# Patient Record
Sex: MALE | Race: WHITE | NOT HISPANIC OR LATINO | URBAN - METROPOLITAN AREA
[De-identification: names, ages, dates, MRNs, and addresses within clinical notes are randomized per-mention and may not be internally consistent; named-entity substitution may affect disease eponyms.]

---
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
## 2025-01-07 ENCOUNTER — OFFICE VISIT (OUTPATIENT)
Dept: URGENT CARE | Facility: CLINIC | Age: 20
End: 2025-01-07
Payer: COMMERCIAL

## 2025-01-07 VITALS
HEART RATE: 88 BPM | HEIGHT: 74 IN | SYSTOLIC BLOOD PRESSURE: 133 MMHG | BODY MASS INDEX: 25.03 KG/M2 | RESPIRATION RATE: 18 BRPM | DIASTOLIC BLOOD PRESSURE: 86 MMHG | WEIGHT: 195 LBS | OXYGEN SATURATION: 99 % | TEMPERATURE: 98 F

## 2025-01-07 DIAGNOSIS — J02.9 VIRAL PHARYNGITIS: Primary | ICD-10-CM

## 2025-01-07 LAB
CTP QC/QA: YES
MOLECULAR STREP A: NEGATIVE

## 2025-01-07 PROCEDURE — 87651 STREP A DNA AMP PROBE: CPT | Mod: QW,S$GLB,, | Performed by: NURSE PRACTITIONER

## 2025-01-07 PROCEDURE — 99203 OFFICE O/P NEW LOW 30 MIN: CPT | Mod: S$GLB,,, | Performed by: NURSE PRACTITIONER

## 2025-01-07 NOTE — PROGRESS NOTES
"Subjective:      Patient ID: Ruel Ching is a 19 y.o. male.    Vitals:  height is 6' 2" (1.88 m) and weight is 88.5 kg (195 lb). His oral temperature is 98.2 °F (36.8 °C). His blood pressure is 133/86 and his pulse is 88. His respiration is 18 and oxygen saturation is 99%.     Chief Complaint: Fever    This is a 19 y.o. male who presents today with a chief complaint of sore throat. Pt states feels like something (like toothpick sensation) is stuck in throat. Can not think of any food getting caught in throat or scratching his throat.  Pt states he had fever-tmax 102, but no fever in last 24 hours.  He initially felt symptom on 1/1 while he was resting. Ibuprofen helped a little bit.     Fever   This is a new problem. The current episode started 4-13 days. The problem occurs constantly. The problem has been unchanged. He has not experienced a heat injury.His temperature was unmeasured prior to arrival. The temperature was taken using an oral thermometer. Associated symptoms include headaches and a sore throat. Pertinent negatives include no congestion, coughing, diarrhea, vomiting or wheezing. He has tried nothing for the symptoms. The treatment provided no relief.       Constitution: Positive for fever.   HENT:  Positive for sore throat. Negative for congestion.    Neck: Negative for neck swelling.   Respiratory:  Negative for cough, shortness of breath and wheezing.    Gastrointestinal:  Negative for vomiting and diarrhea.   Neurological:  Positive for headaches.      Objective:     Physical Exam   Constitutional: He is oriented to person, place, and time.  Non-toxic appearance. He does not appear ill. No distress.   HENT:   Head: Normocephalic.   Ears:   Right Ear: Tympanic membrane, external ear and ear canal normal.   Left Ear: Tympanic membrane, external ear and ear canal normal.   Nose: Nose normal.   Mouth/Throat: Mucous membranes are moist. Posterior oropharyngeal erythema present.      Comments: Pt has " what looks like healing vesicles vs enlarged pores to bilateral tonsils, with mild erythema. No exudates noted. Airway patent.   Eyes: Right eye exhibits no discharge. Left eye exhibits no discharge.   Neck: Neck supple. No neck rigidity present.   Cardiovascular: Normal rate and regular rhythm.   Pulmonary/Chest: Effort normal and breath sounds normal.   Abdominal: Normal appearance.   Musculoskeletal: Normal range of motion.         General: Normal range of motion.   Lymphadenopathy:     He has no cervical adenopathy.   Neurological: He is alert and oriented to person, place, and time.   Skin: Skin is warm, dry and not diaphoretic.   Psychiatric: His behavior is normal. Mood normal.   Nursing note and vitals reviewed.    Results for orders placed or performed in visit on 01/07/25   POCT Strep A, Molecular    Collection Time: 01/07/25 11:38 AM   Result Value Ref Range    Molecular Strep A, POC Negative Negative     Acceptable Yes       Assessment:     1. Viral pharyngitis        Plan:       Viral pharyngitis  -     POCT Strep A, Molecular      Patient Instructions   Oral fluids-keep throat moist at all times   Rest  Soft foods for throat pain   Droplet and contact precautions (good handwashing, cover coughs and sneezes, no food/drink sharing, wipe down surfaces after use)   Warm salt water gargles   Ibuprofen and/or tylenol for pain relief

## 2025-07-29 ENCOUNTER — TELEPHONE (OUTPATIENT)
Dept: INTERNAL MEDICINE | Facility: CLINIC | Age: 20
End: 2025-07-29
Payer: COMMERCIAL

## 2025-07-30 ENCOUNTER — OFFICE VISIT (OUTPATIENT)
Dept: INTERNAL MEDICINE | Facility: CLINIC | Age: 20
End: 2025-07-30
Payer: COMMERCIAL

## 2025-07-30 VITALS
SYSTOLIC BLOOD PRESSURE: 134 MMHG | DIASTOLIC BLOOD PRESSURE: 82 MMHG | WEIGHT: 246.69 LBS | BODY MASS INDEX: 31.66 KG/M2 | OXYGEN SATURATION: 98 % | HEIGHT: 74 IN | HEART RATE: 66 BPM

## 2025-07-30 DIAGNOSIS — R29.898 OTHER SYMPTOMS AND SIGNS INVOLVING THE MUSCULOSKELETAL SYSTEM: ICD-10-CM

## 2025-07-30 DIAGNOSIS — Z00.00 ANNUAL PHYSICAL EXAM: Primary | ICD-10-CM

## 2025-07-30 DIAGNOSIS — E66.09 CLASS 1 OBESITY DUE TO EXCESS CALORIES WITHOUT SERIOUS COMORBIDITY WITH BODY MASS INDEX (BMI) OF 31.0 TO 31.9 IN ADULT: ICD-10-CM

## 2025-07-30 DIAGNOSIS — Z13.6 ENCOUNTER FOR SCREENING FOR CARDIOVASCULAR DISORDERS: ICD-10-CM

## 2025-07-30 DIAGNOSIS — E66.811 CLASS 1 OBESITY DUE TO EXCESS CALORIES WITHOUT SERIOUS COMORBIDITY WITH BODY MASS INDEX (BMI) OF 31.0 TO 31.9 IN ADULT: ICD-10-CM

## 2025-07-30 DIAGNOSIS — Z13.1 SCREENING FOR DIABETES MELLITUS: ICD-10-CM

## 2025-07-30 DIAGNOSIS — Z13.29 SCREENING FOR THYROID DISORDER: ICD-10-CM

## 2025-07-30 PROCEDURE — 3075F SYST BP GE 130 - 139MM HG: CPT | Mod: CPTII,S$GLB,,

## 2025-07-30 PROCEDURE — 99385 PREV VISIT NEW AGE 18-39: CPT | Mod: S$GLB,,,

## 2025-07-30 PROCEDURE — 99999 PR PBB SHADOW E&M-EST. PATIENT-LVL III: CPT | Mod: PBBFAC,,,

## 2025-07-30 PROCEDURE — 3008F BODY MASS INDEX DOCD: CPT | Mod: CPTII,S$GLB,,

## 2025-07-30 PROCEDURE — 3079F DIAST BP 80-89 MM HG: CPT | Mod: CPTII,S$GLB,,

## 2025-07-30 NOTE — PROGRESS NOTES
Internal Medicine Annual Exam       CHIEF COMPLAINT     The patient, Ruel Ching, who is a 20 y.o. male presents for an annual exam.    DAXA Lipscomb reports feeling well with no present issues.    RIGHT SHOULDER  He notes a long standing ability to rotate his right shoulder blade independently compared to his left side. He denies pain. He confirms no decreased range of motion.    SLEEP:  He obtains approximately 8 hours of sleep per night. He experiences difficulty falling asleep, which he attributes to prolonged phone use prior to bedtime. He tends to sleep longer when his alarm does not wake him up.    DIET AND EXERCISE:  He reports a consistent exercise routine of daily workouts for approximately 20 consecutive days without rest. He recently took his first rest day 3-4 days ago and has not yet exercised today. He prepares meals including salads with chicken and burgers using a small grill in his apartment. He admits to consuming junk food frequently. He receives additional meals from his mother when visiting her.    GASTROINTESTINAL:  He reports bowel movements occurring every other day. He describes bowel movements as appearing normal in consistency, without blood or mucus. He denies experiencing diarrhea.    SUPPLEMENTS:  He takes creatine and protein supplements.     SOCIAL HISTORY:  He denies current sexual activity. He reports drinking only on special occasions, typically when spending time with his father. He denies drinking weekly. When consuming alcohol, he states he drinks to achieve a slight buzz, which he describes as either 10 beers or 2 old-fashioneds in a single occasion. He acknowledges the potential health risks of consuming large quantities of alcohol.    ROS:  General: -fever, -chills, -fatigue, -weight gain, -weight loss  Eyes: -vision changes, -redness, -discharge  ENT: -ear pain, -nasal congestion, -sore throat  Cardiovascular: -chest pain, -palpitations, -lower extremity  edema  Respiratory: -cough, -shortness of breath  Gastrointestinal: -abdominal pain, -nausea, -vomiting, -diarrhea, -constipation, -blood in stool  Genitourinary: -dysuria, -hematuria, -frequency  Musculoskeletal: -joint pain, -muscle pain, +pain with movement  Skin: -rash, -lesion  Neurological: -headache, -dizziness, -numbness, -tingling, +difficulty falling asleep  Psychiatric: -anxiety, -depression, -sleep difficulty        Past Medical History:  No past medical history on file.    No past surgical history on file.     Family History   Problem Relation Name Age of Onset    Migraines Mother      Hypertension Father      Brain cancer Maternal Grandmother      Lung cancer Maternal Grandfather      Hypertension Paternal Grandmother      Prostate cancer Paternal Grandfather        Social History[1]     Tobacco Use History[2]     Allergies as of 07/30/2025 - Reviewed 07/30/2025   Allergen Reaction Noted    Insect venom Other (See Comments) 05/25/2022    Grape Rash 05/25/2022    Donalds Rash 05/25/2022      Home Medications:  Prior to Admission medications    Not on File     Health Maintainence:   Immunizations:  Health Maintenance         Date Due Completion Date    Hepatitis C Screening Never done ---    Lipid Panel Never done ---    HIV Screening Never done ---    HPV Vaccines (2 - Male 3-dose series) 06/22/2022 5/25/2022    COVID-19 Vaccine (3 - 2024-25 season) 07/30/2026 (Originally 9/1/2024) 8/20/2021    Influenza Vaccine (1) 09/01/2025 11/29/2016    TETANUS VACCINE 11/29/2026 11/29/2016    RSV Vaccine (Age 60+ and Pregnant patients) (1 - 1-dose 75+ series) 03/04/2080 ---           PHYSICAL EXAM     Vitals: BMI: 31.67.  General: No acute distress. Well-developed. Obese  Eyes: EOMI. Sclerae anicteric.  HENT: Normocephalic. Atraumatic. Nares patent. Moist oral mucosa.  Ears: Bilateral TMs clear. Bilateral EACs clear.  Cardiovascular: Regular rate. Regular rhythm. No murmurs. No rubs. No gallops. Normal S1,  "S2.  Respiratory: Normal respiratory effort. Clear to auscultation bilaterally. No rales. No rhonchi. No wheezing.  Abdomen: Soft. Non-tender. Non-distended. Normoactive bowel sounds.  Musculoskeletal: No  obvious deformity.  Extremities: No lower extremity edema.  Neurological: Alert & oriented x3. No slurred speech. Normal gait.  Psychiatric: Normal mood. Normal affect. Good insight. Good judgment.  Skin: Warm. Dry. No rash.        /82 (BP Location: Left arm, Patient Position: Sitting)   Pulse 66   Ht 6' 2" (1.88 m)   Wt 111.9 kg (246 lb 11.1 oz)   SpO2 98%   BMI 31.67 kg/m²  Body mass index is 31.67 kg/m².    LABS     No results found for: "LABA1C", "HGBA1C"  CMP  No results found for: "NA", "K", "CL", "CO2", "GLU", "BUN", "CREATININE", "CALCIUM", "PROT", "ALBUMIN", "BILITOT", "ALKPHOS", "AST", "ALT", "ANIONGAP", "ESTGFRAFRICA", "EGFRNONAA"  No results found for: "WBC", "HGB", "HCT", "MCV", "PLT"  No results found for: "CHOL"  No results found for: "HDL"  No results found for: "LDLCALC"  No results found for: "TRIG"  No results found for: "CHOLHDL"  No results found for: "TSH", "E2GDBXQ", "[id number]", "THYROIDAB"    ASSESSMENT/PLAN     Ruel Ching is a very pleasant 20 y.o. male    ANNUAL PHYSICAL EXAM  - Assessed overall health status as very healthy based on physical exam and reported symptoms.  - Reviewed use of creatine and protein supplements; will assess kidney function through labs.   - Advised maintaining adequate hydration, especially when using creatine.  - Discussed importance of safe sex practices, emphasizing condom use. Pt reports he has not been sexual active and declines STI testing.  - Advised on the importance of moderating alcohol consumption and its potential long-term health impacts.  - Discussed importance of sleep hygiene and recommended: avoiding phone use and reducing TV time before bed, taking a shower or bath, reading a book, avoiding caffeine consumption and eating " immediately before bedtime, and implementing relaxation techniques to improve sleep onset.  -     LIPID PANEL; Future; Expected date: 07/30/2025  -     CBC W/ AUTO DIFFERENTIAL; Future; Expected date: 07/30/2025  -     COMPREHENSIVE METABOLIC PANEL; Future; Expected date: 07/30/2025  -     TSH; Future; Expected date: 07/30/2025  -     Urinalysis Microscopic  -     HEMOGLOBIN A1C; Future; Expected date: 07/30/2025    OBESITY:  - Calculated BMI at 31.67, classified as class 1 obesity without comorbidities.  - Educated patient on potential health risks including insulin resistance, diabetes, hypertension, and their effects on kidneys, eyes, and heart.  - Discussed how excess caloric intake contributes to current BMI classification.  - Recommend incorporating more vegetables into diet and reducing intake of high-sodium and high-fat foods  - Ordered lipid panel and hemoglobin A1C to assess metabolic health.    SYMPTOMS AND SIGNS INVOLVING THE MUSCULOSKELETAL SYSTEM:  - Hypermobility of right scapula is likely Scapular Winging.  - Confirmed no pain or decreased range of motion, and completely voluntary.  - No immediate intervention necessary as long as discomfort and range of motion remain normal.  - Advised to continue current exercise routine with focus on proper form during upper body workouts.    Screening for diabetes mellitus  -     HEMOGLOBIN A1C; Future; Expected date: 07/30/2025    Screening for thyroid disorder  -     TSH; Future; Expected date: 07/30/2025    Encounter for screening for cardiovascular disorders  -     LIPID PANEL; Future; Expected date: 07/30/2025  -     CBC W/ AUTO DIFFERENTIAL; Future; Expected date: 07/30/2025  -     COMPREHENSIVE METABOLIC PANEL; Future; Expected date: 07/30/2025    To establish care with PCP    This note was generated with the assistance of ambient listening technology. Verbal consent was obtained by the patient and accompanying visitor(s) for the recording of patient  appointment to facilitate this note. I attest to having reviewed and edited the generated note for accuracy, though some syntax or spelling errors may persist. Please contact the author of this note for any clarification.       Annita ACOSTA, LEO, FNP-c   Department of Internal Medicine - [facility]  1:49 PM         [1]   Social History  Socioeconomic History    Marital status: Single   Tobacco Use    Smoking status: Never     Passive exposure: Never    Smokeless tobacco: Never   Substance and Sexual Activity    Alcohol use: Never   [2]   Social History  Tobacco Use   Smoking Status Never    Passive exposure: Never   Smokeless Tobacco Never

## 2025-07-31 ENCOUNTER — LAB VISIT (OUTPATIENT)
Dept: LAB | Facility: HOSPITAL | Age: 20
End: 2025-07-31
Payer: COMMERCIAL

## 2025-07-31 DIAGNOSIS — Z13.1 SCREENING FOR DIABETES MELLITUS: ICD-10-CM

## 2025-07-31 DIAGNOSIS — Z00.00 ANNUAL PHYSICAL EXAM: ICD-10-CM

## 2025-07-31 DIAGNOSIS — Z13.29 SCREENING FOR THYROID DISORDER: ICD-10-CM

## 2025-07-31 DIAGNOSIS — Z13.6 ENCOUNTER FOR SCREENING FOR CARDIOVASCULAR DISORDERS: ICD-10-CM

## 2025-07-31 LAB
ABSOLUTE EOSINOPHIL (OHS): 0.27 K/UL
ABSOLUTE MONOCYTE (OHS): 0.86 K/UL (ref 0.3–1)
ABSOLUTE NEUTROPHIL COUNT (OHS): 3.21 K/UL (ref 1.8–7.7)
ALBUMIN SERPL BCP-MCNC: 4.4 G/DL (ref 3.5–5.2)
ALP SERPL-CCNC: 60 UNIT/L (ref 40–150)
ALT SERPL W/O P-5'-P-CCNC: 68 UNIT/L (ref 0–55)
ANION GAP (OHS): 8 MMOL/L (ref 8–16)
AST SERPL-CCNC: 37 UNIT/L (ref 0–50)
BASOPHILS # BLD AUTO: 0.06 K/UL
BASOPHILS NFR BLD AUTO: 0.8 %
BILIRUB SERPL-MCNC: 0.7 MG/DL (ref 0.1–1)
BUN SERPL-MCNC: 17 MG/DL (ref 6–20)
CALCIUM SERPL-MCNC: 9.4 MG/DL (ref 8.7–10.5)
CHLORIDE SERPL-SCNC: 105 MMOL/L (ref 95–110)
CHOLEST SERPL-MCNC: 143 MG/DL (ref 120–199)
CHOLEST/HDLC SERPL: 3.3 {RATIO} (ref 2–5)
CO2 SERPL-SCNC: 25 MMOL/L (ref 23–29)
CREAT SERPL-MCNC: 1.1 MG/DL (ref 0.5–1.4)
EAG (OHS): 91 MG/DL (ref 68–131)
ERYTHROCYTE [DISTWIDTH] IN BLOOD BY AUTOMATED COUNT: 12.6 % (ref 11.5–14.5)
GFR SERPLBLD CREATININE-BSD FMLA CKD-EPI: >60 ML/MIN/1.73/M2
GLUCOSE SERPL-MCNC: 89 MG/DL (ref 70–110)
HBA1C MFR BLD: 4.8 % (ref 4–5.6)
HCT VFR BLD AUTO: 50 % (ref 40–54)
HDLC SERPL-MCNC: 43 MG/DL (ref 40–75)
HDLC SERPL: 30.1 % (ref 20–50)
HGB BLD-MCNC: 16.1 GM/DL (ref 14–18)
IMM GRANULOCYTES # BLD AUTO: 0.02 K/UL (ref 0–0.04)
IMM GRANULOCYTES NFR BLD AUTO: 0.3 % (ref 0–0.5)
LDLC SERPL CALC-MCNC: 80.8 MG/DL (ref 63–159)
LYMPHOCYTES # BLD AUTO: 3.16 K/UL (ref 1–4.8)
MCH RBC QN AUTO: 29.6 PG (ref 27–31)
MCHC RBC AUTO-ENTMCNC: 32.2 G/DL (ref 32–36)
MCV RBC AUTO: 92 FL (ref 82–98)
NONHDLC SERPL-MCNC: 100 MG/DL
NUCLEATED RBC (/100WBC) (OHS): 0 /100 WBC
PLATELET # BLD AUTO: 248 K/UL (ref 150–450)
PMV BLD AUTO: 12 FL (ref 9.2–12.9)
POTASSIUM SERPL-SCNC: 4.5 MMOL/L (ref 3.5–5.1)
PROT SERPL-MCNC: 7.5 GM/DL (ref 6–8.4)
RBC # BLD AUTO: 5.44 M/UL (ref 4.6–6.2)
RELATIVE EOSINOPHIL (OHS): 3.6 %
RELATIVE LYMPHOCYTE (OHS): 41.7 % (ref 18–48)
RELATIVE MONOCYTE (OHS): 11.3 % (ref 4–15)
RELATIVE NEUTROPHIL (OHS): 42.3 % (ref 38–73)
SODIUM SERPL-SCNC: 138 MMOL/L (ref 136–145)
TRIGL SERPL-MCNC: 96 MG/DL (ref 30–150)
TSH SERPL-ACNC: 1.3 UIU/ML (ref 0.4–4)
WBC # BLD AUTO: 7.58 K/UL (ref 3.9–12.7)

## 2025-07-31 PROCEDURE — 80053 COMPREHEN METABOLIC PANEL: CPT

## 2025-07-31 PROCEDURE — 85025 COMPLETE CBC W/AUTO DIFF WBC: CPT

## 2025-07-31 PROCEDURE — 84443 ASSAY THYROID STIM HORMONE: CPT

## 2025-07-31 PROCEDURE — 83718 ASSAY OF LIPOPROTEIN: CPT

## 2025-07-31 PROCEDURE — 83036 HEMOGLOBIN GLYCOSYLATED A1C: CPT

## 2025-07-31 PROCEDURE — 36415 COLL VENOUS BLD VENIPUNCTURE: CPT
